# Patient Record
(demographics unavailable — no encounter records)

---

## 2024-12-13 NOTE — NUR
12/13/24 [id number] [address]
REPORT RECEIVED FROM RISHABH SANCHEZ. PT SEEN IN SDU ROOM AND DENIES
EXCESSIVE PAIN OR NAUSEA. PT WAS MEDICATED BY RISHABH SANCHEZ PRIOR TO THIS
RN TAKING OVER. SPOUSE AT BEDSIDE. PT REPORTS PAIN IS TOLERABLE AND
VOICES READY FOR DC INSTRUCTIONS. ENGAGED IN DC TEACHING WITH PATIENT
AND FAMILY. ALL QUESTIONS ASKED AND ANSWERED. PROVIDED W/COPY OF DC
INSTRUCTIONS. PT ESCORTED TO BATHROOM AND VOIDED PRIOR TO DISCHARGE.
PT DC AMBULATORY TO WAITING CAR